# Patient Record
Sex: FEMALE | Race: WHITE | URBAN - NONMETROPOLITAN AREA
[De-identification: names, ages, dates, MRNs, and addresses within clinical notes are randomized per-mention and may not be internally consistent; named-entity substitution may affect disease eponyms.]

---

## 2017-11-03 PROBLEM — H52.222: Noted: 2017-11-03

## 2017-11-03 PROBLEM — H52.03: Noted: 2017-11-03

## 2019-09-23 ENCOUNTER — IMPORTED ENCOUNTER (OUTPATIENT)
Dept: URBAN - NONMETROPOLITAN AREA CLINIC 1 | Facility: CLINIC | Age: 24
End: 2019-09-23

## 2019-09-23 PROCEDURE — 92015 DETERMINE REFRACTIVE STATE: CPT

## 2019-09-23 PROCEDURE — 92014 COMPRE OPH EXAM EST PT 1/>: CPT

## 2019-09-23 NOTE — PATIENT DISCUSSION
Hyperopia-Discussed diagnosis with patient. Presbyopia-Discussed diagnosis with patient. Updated spec Rx given.

## 2019-11-15 ENCOUNTER — IMPORTED ENCOUNTER (OUTPATIENT)
Dept: URBAN - NONMETROPOLITAN AREA CLINIC 1 | Facility: CLINIC | Age: 24
End: 2019-11-15

## 2019-11-15 PROBLEM — H53.19: Noted: 2019-11-15

## 2019-11-15 PROBLEM — H52.222: Noted: 2019-11-15

## 2019-11-15 PROBLEM — H52.03: Noted: 2019-11-15

## 2019-11-15 PROCEDURE — 99214 OFFICE O/P EST MOD 30 MIN: CPT

## 2019-11-15 NOTE — PATIENT DISCUSSION
visual disturbance odeducate pt on findingspossible migraineHyperopia-Discussed diagnosis with patient. Presbyopia-Discussed diagnosis with patient. Updated spec Rx given.

## 2022-04-10 ASSESSMENT — TONOMETRY
OD_IOP_MMHG: 13
OS_IOP_MMHG: 13
OS_IOP_MMHG: 12
OD_IOP_MMHG: 12

## 2022-04-10 ASSESSMENT — VISUAL ACUITY
OD_SC: 20/20
OD_SC: 20/20
OS_CC: J1
OD_CC: J1
OS_SC: 20/20
OS_SC: 20/20